# Patient Record
Sex: FEMALE | URBAN - METROPOLITAN AREA
[De-identification: names, ages, dates, MRNs, and addresses within clinical notes are randomized per-mention and may not be internally consistent; named-entity substitution may affect disease eponyms.]

---

## 2017-03-01 ENCOUNTER — TELEPHONE (OUTPATIENT)
Dept: OTHER | Facility: CLINIC | Age: 62
End: 2017-03-01

## 2017-03-01 NOTE — TELEPHONE ENCOUNTER
Call Regarding Onboarding are    Attempt 1    Message on voicemail     Comments:       Outreach   Vivian Ridley

## 2017-06-19 ENCOUNTER — TRANSFERRED RECORDS (OUTPATIENT)
Dept: HEALTH INFORMATION MANAGEMENT | Facility: CLINIC | Age: 62
End: 2017-06-19

## 2017-07-24 ENCOUNTER — OFFICE VISIT (OUTPATIENT)
Dept: FAMILY MEDICINE | Facility: CLINIC | Age: 62
End: 2017-07-24
Payer: COMMERCIAL

## 2017-07-24 VITALS
BODY MASS INDEX: 19.81 KG/M2 | HEIGHT: 64 IN | HEART RATE: 58 BPM | RESPIRATION RATE: 16 BRPM | SYSTOLIC BLOOD PRESSURE: 118 MMHG | TEMPERATURE: 96.3 F | OXYGEN SATURATION: 100 % | WEIGHT: 116 LBS | DIASTOLIC BLOOD PRESSURE: 73 MMHG

## 2017-07-24 DIAGNOSIS — Z00.00 ROUTINE GENERAL MEDICAL EXAMINATION AT A HEALTH CARE FACILITY: Primary | ICD-10-CM

## 2017-07-24 DIAGNOSIS — N36.9 URETHRAL LESION: ICD-10-CM

## 2017-07-24 LAB
CHOLEST SERPL-MCNC: 217 MG/DL
GLUCOSE SERPL-MCNC: 96 MG/DL (ref 70–99)
HDLC SERPL-MCNC: 102 MG/DL
LDLC SERPL CALC-MCNC: 103 MG/DL
NONHDLC SERPL-MCNC: 115 MG/DL
TRIGL SERPL-MCNC: 58 MG/DL

## 2017-07-24 PROCEDURE — 87624 HPV HI-RISK TYP POOLED RSLT: CPT | Performed by: FAMILY MEDICINE

## 2017-07-24 PROCEDURE — 99386 PREV VISIT NEW AGE 40-64: CPT | Performed by: FAMILY MEDICINE

## 2017-07-24 PROCEDURE — 80061 LIPID PANEL: CPT | Performed by: FAMILY MEDICINE

## 2017-07-24 PROCEDURE — 82947 ASSAY GLUCOSE BLOOD QUANT: CPT | Performed by: FAMILY MEDICINE

## 2017-07-24 PROCEDURE — 36415 COLL VENOUS BLD VENIPUNCTURE: CPT | Performed by: FAMILY MEDICINE

## 2017-07-24 PROCEDURE — G0123 SCREEN CERV/VAG THIN LAYER: HCPCS | Performed by: FAMILY MEDICINE

## 2017-07-24 NOTE — MR AVS SNAPSHOT
After Visit Summary   7/24/2017    Madeline Strange    MRN: [id number]           Patient Information     Date Of Birth          1955        Visit Information        Provider Department      7/24/2017 8:40 AM Lisa Murillo MD [facility]        Today's Diagnoses     Routine general medical examination at a health care facility    -  1    Urethral lesion          Care Instructions      Preventive Health Recommendations  Female Ages 50 - 64    Yearly exam: See your health care provider every year in order to  o Review health changes.   o Discuss preventive care.    o Review your medicines if your doctor has prescribed any.      Get a Pap test every three years (unless you have an abnormal result and your provider advises testing more often).    If you get Pap tests with HPV test, you only need to test every 5 years, unless you have an abnormal result.     You do not need a Pap test if your uterus was removed (hysterectomy) and you have not had cancer.    You should be tested each year for STDs (sexually transmitted diseases) if you're at risk.     Have a mammogram every 1 to 2 years.    Have a colonoscopy at age 50, or have a yearly FIT test (stool test). These exams screen for colon cancer.      Have a cholesterol test every 5 years, or more often if advised.    Have a diabetes test (fasting glucose) every three years. If you are at risk for diabetes, you should have this test more often.     If you are at risk for osteoporosis (brittle bone disease), think about having a bone density scan (DEXA).    Shots: Get a flu shot each year. Get a tetanus shot every 10 years.    Nutrition:     Eat at least 5 servings of fruits and vegetables each day.    Eat whole-grain bread, whole-wheat pasta and brown rice instead of white grains and rice.    Talk to your provider about Calcium and Vitamin D.     Lifestyle    Exercise at least 150 minutes a week (30 minutes a day, 5 days a week).  This will help you control your weight and prevent disease.    Limit alcohol to one drink per day.    No smoking.     Wear sunscreen to prevent skin cancer.     See your dentist every six months for an exam and cleaning.    See your eye doctor every 1 to 2 years.            Follow-ups after your visit        Additional Services     UROLOGY ADULT REFERRAL       Your provider has referred you to: G: [facility] [phone] [email or web address]  [facility] [place] [facility] - [place] [phone]   [email or web address]    Please be aware that coverage of these services is subject to the terms and limitations of your health insurance plan.  Call member services at your health plan with any benefit or coverage questions.      Please bring the following with you to your appointment:    (1) Any X-Rays, CTs or MRIs which have been performed.  Contact the facility where they were done to arrange for  prior to your scheduled appointment.    (2) List of current medications  (3) This referral request   (4) Any documents/labs given to you for this referral                  Who to contact     If you have questions or need follow up information about today's clinic visit or your schedule please contact [facility] directly at [phone].  Normal or non-critical lab and imaging results will be communicated to you by MyChart, letter or phone within 4 business days after the clinic has received the results. If you do not hear from us within 7 days, please contact the clinic through MyChart or phone. If you have a critical or abnormal lab result, we will notify you by phone as soon as possible.  Submit refill requests through Walk-in Appointment Scheduler or call your pharmacy and they will forward the refill request to us. Please allow 3 business days for your refill to  "be completed.          Additional Information About Your Visit        [employer] Information     [employer] lets you send messages to your doctor, view your test results, renew your prescriptions, schedule appointments and more. To sign up, go to [email or web address] . Click on \"Log in\" on the left side of the screen, which will take you to the Welcome page. Then click on \"Sign up Now\" on the right side of the page.     You will be asked to enter the access code listed below, as well as some personal information. Please follow the directions to create your username and password.     Your access code is: [email or web address]  Expires: 10/22/2017  8:58 AM     Your access code will  in 90 days. If you need help or a new code, please call your [facility] clinic or [phone].        Care EveryWhere ID     This is your Care EveryWhere ID. This could be used by other organizations to access your [facility] medical records  [id number]        Your Vitals Were     Pulse Temperature Respirations Height Pulse Oximetry BMI (Body Mass Index)    58 96.3  F (35.7  C) (Tympanic) 16 5' 4\" (1.626 m) 100% 19.91 kg/m2       Blood Pressure from Last 3 Encounters:   17 118/73    Weight from Last 3 Encounters:   17 116 lb (52.6 kg)              We Performed the Following     GLUCOSE     HPV High Risk Types DNA Cervical     Lipid Profile     Pap imaged thin layer screen with HPV - recommended age 30 - 65     UROLOGY ADULT REFERRAL        Primary Care Provider Office Phone # Fax #    Lisa Murillo [phone]       [facility] [address] [place] [address]        Equal Access to Services     [facility] TRINITY AH: Hadii philly morillo hadashmichelle Solucy, waaxda luqadaha, qaybta kaalmada adelawrenceyada, valdo dotson. So [facility] [phone].    ATENCIÓN: Si habla español, tiene a nick disposición servicios gratuitos de asistencia lingüística. Llame al [phone].    We comply with " applicable federal civil rights laws and [place] laws. We do not discriminate on the basis of race, color, national origin, age, disability sex, sexual orientation or gender identity.            Thank you!     Thank you for choosing [facility]  for your care. Our goal is always to provide you with excellent care. Hearing back from our patients is one way we can continue to improve our services. Please take a few minutes to complete the written survey that you may receive in the mail after your visit with us. Thank you!             Your Updated Medication List - Protect others around you: Learn how to safely use, store and throw away your medicines at www.disposemymeds.org.          This list is accurate as of: 7/24/17  8:58 AM.  Always use your most recent med list.                   Brand Name Dispense Instructions for use Diagnosis    aspirin 81 MG tablet      Take 81 mg by mouth daily    Routine general medical examination at a health care facility       calcium-vitamin D 600-400 MG-UNIT per tablet    CALTRATE     Take 1 tablet by mouth    Routine general medical examination at a health care facility

## 2017-07-24 NOTE — NURSING NOTE
"Chief Complaint   Patient presents with     Physical       Initial /73 (BP Location: Right arm, Patient Position: Sitting, Cuff Size: Adult Regular)  Pulse 58  Temp 96.3  F (35.7  C) (Tympanic)  Resp 16  Ht 5' 4\" (1.626 m)  Wt 116 lb (52.6 kg)  SpO2 100%  BMI 19.91 kg/m2 Estimated body mass index is 19.91 kg/(m^2) as calculated from the following:    Height as of this encounter: 5' 4\" (1.626 m).    Weight as of this encounter: 116 lb (52.6 kg).  Medication Reconciliation: complete         Diann Caldera MA      "

## 2017-07-24 NOTE — PROGRESS NOTES
SUBJECTIVE:   CC: Madeline Strange is an 62 year old male who presents for preventative health visit.     Physical   Annual:     Getting at least 3 servings of Calcium per day::  Yes    Bi-annual eye exam::  Yes    Dental care twice a year::  Yes    Sleep apnea or symptoms of sleep apnea::  None    Diet::  Regular (no restrictions)    Frequency of exercise::  4-5 days/week    Duration of exercise::  30-45 minutes    Taking medications regularly::  Not Applicable    Additional concerns today::  YES (pt will like a visual inspection during pap smear , lip problem)    CV: no personal h/o CVD, though much in her family hx.  Lipids checked last year were doing well.   Malignancy:  Up to date on colonoscopy and mammogram.  Pap is also up to date, but patient prefers repeat, as she has noticed changes.  About 2 weeks ago, there was a spot of blood in her underpants, and she looked and saw some redness and a black spot in the vaginal area.  She also notes that her dermatologist applied cryotherapy to a precancerous lesion on her lower lip in June; she has noticed a lighter spot in this area.  It is asymptomatic  Bone health: osteopenia, taking calcium and D, last BDS 6/8/15, repeat in 3-5 years.  Mother and sister both with osteoporosis.    Immunizations: up to date on tdap last year, zoster done 2013.  Sexual health: monogamous with , have slowed down in sexual activity, uses [place] to help with postmenopausal changes.    Depression screen: neg          Today's PHQ-2 Score:   PHQ-2 ( 1999 [employer]) 7/24/2017   Q1: Little interest or pleasure in doing things 0   Q2: Feeling down, depressed or hopeless 0   PHQ-2 Score 0   Q1: Little interest or pleasure in doing things Not at all   Q2: Feeling down, depressed or hopeless Not at all   PHQ-2 Score 0       Abuse: Current or Past(Physical, Sexual or Emotional)- No  Do you feel safe in your environment - Yes    Social History   Substance Use Topics     Smoking status: Not on  "file     Smokeless tobacco: Not on file     Alcohol use Not on file     The patient does not drink >3 drinks per day nor >7 drinks per week.    Last PSA: No results found for: PSA    Reviewed orders with patient. Reviewed health maintenance and updated orders accordingly - Yes  Patient Active Problem List   Diagnosis     Osteopenia     Past Surgical History:   Procedure Laterality Date     TUBAL LIGATION         Social History   Substance Use Topics     Smoking status: Never Smoker     Smokeless tobacco: Never Used     Alcohol use Yes      Comment: 1 a day      Family History   Problem Relation Age of Onset     OSTEOPOROSIS Mother      Osteopenia Sister          Current Outpatient Prescriptions   Medication Sig Dispense Refill     aspirin 81 MG tablet Take 81 mg by mouth daily       calcium-vitamin D (CALTRATE) 600-400 MG-UNIT per tablet Take 1 tablet by mouth       No Known Allergies      Reviewed and updated as needed this visit by clinical staff         Reviewed and updated as needed this visit by Provider            ROS:  C: NEGATIVE for fever, chills, change in weight  I: NEGATIVE for worrisome rashes, moles or lesions  E: NEGATIVE for vision changes or irritation  ENT: NEGATIVE for ear, mouth and throat problems  R: NEGATIVE for significant cough or SOB  CV: NEGATIVE for chest pain, palpitations or peripheral edema  GI: NEGATIVE for nausea, abdominal pain, heartburn, or change in bowel habits   male: negative for dysuria, hematuria, decreased urinary stream, erectile dysfunction, urethral discharge  M: NEGATIVE for significant arthralgias or myalgia  N: NEGATIVE for weakness, dizziness or paresthesias  P: NEGATIVE for changes in mood or affect    OBJECTIVE:   /73 (BP Location: Right arm, Patient Position: Sitting, Cuff Size: Adult Regular)  Pulse 58  Temp 96.3  F (35.7  C) (Tympanic)  Resp 16  Ht 5' 4\" (1.626 m)  Wt 116 lb (52.6 kg)  SpO2 100%  BMI 19.91 kg/m2    EXAM:  GENERAL: healthy, alert " and no distress  EYES: Eyes grossly normal to inspection, PERRL and conjunctivae and sclerae normal  HENT: ear canals and TM's normal, nose and mouth without ulcers or lesions; mild hypopigmentation to lower lip, no concerning lesions noted  NECK: no adenopathy, no asymmetry, masses, or scars and thyroid normal to palpation  RESP: lungs clear to auscultation - no rales, rhonchi or wheezes  BREAST: normal without masses, tenderness or nipple discharge and no palpable axillary masses or adenopathy  CV: regular rate and rhythm, normal S1 S2, no S3 or S4, no murmur, click or rub, no peripheral edema and peripheral pulses strong  ABDOMEN: soft, nontender, no hepatosplenomegaly, no masses and bowel sounds normal   (female): normal female external genitalia, urethral meatus with a tiny cyst or polyp, and there is a small area of erythema below the urethra on the mucosal surface, not actively bleeding, no pigmented lesions.   vaginal mucosa with vaginal atrophy  MS: no gross musculoskeletal defects noted, no edema  SKIN: no suspicious lesions or rashes  NEURO: Normal strength and tone, mentation intact and speech normal  PSYCH: mentation appears normal, affect normal/bright    ASSESSMENT/PLAN:   1. Routine general medical examination at a health care facility    - GLUCOSE  - Pap imaged thin layer screen with HPV - recommended age 30 - 65  - HPV High Risk Types DNA Cervical  - aspirin 81 MG tablet; Take 81 mg by mouth daily  - calcium-vitamin D (CALTRATE) 600-400 MG-UNIT per tablet; Take 1 tablet by mouth  - Lipid Profile    2. Urethral lesion    - UROLOGY ADULT REFERRAL    COUNSELING:   Reviewed preventive health counseling, as reflected in patient instructions       Osteoporosis Prevention/Bone Health           has no tobacco history on file.    There is no height or weight on file to calculate BMI.       Counseling Resources:  ATP IV Guidelines  Pooled Cohorts Equation Calculator  FRAX Risk Assessment  ICSI Preventive  Guidelines  Dietary Guidelines for Americans, 2010  USDA's MyPlate  ASA Prophylaxis  Lung CA Screening    Lisa Murillo MD  [facility]  Answers for HPI/ROS submitted by the patient on 7/24/2017   PHQ-2 Score: 0

## 2017-07-24 NOTE — Clinical Note
Please abstract the following data from this visit with this patient into the appropriate field in Epic:  Mammogram done on this date: 7/12/2016 (approximately), by this group: [facility], results were normal.  Pap smear done on this date: 7/20/2015 (approximately), by this group: [facility], results were normal.

## 2017-07-25 ENCOUNTER — PRE VISIT (OUTPATIENT)
Dept: UROLOGY | Facility: CLINIC | Age: 62
End: 2017-07-25

## 2017-07-25 NOTE — TELEPHONE ENCOUNTER
Patient with history of urethral lesion coming in for consult with Dr. Harris. Patient chart reviewed, no need for call, all records available and ready for appointment.

## 2017-07-26 ENCOUNTER — OFFICE VISIT (OUTPATIENT)
Dept: UROLOGY | Facility: CLINIC | Age: 62
End: 2017-07-26

## 2017-07-26 VITALS
HEART RATE: 70 BPM | HEIGHT: 64 IN | BODY MASS INDEX: 20.32 KG/M2 | DIASTOLIC BLOOD PRESSURE: 69 MMHG | WEIGHT: 119 LBS | SYSTOLIC BLOOD PRESSURE: 119 MMHG

## 2017-07-26 DIAGNOSIS — N36.2 URETHRAL CARUNCLE: Primary | ICD-10-CM

## 2017-07-26 DIAGNOSIS — N95.2 ATROPHIC VAGINITIS: Primary | ICD-10-CM

## 2017-07-26 DIAGNOSIS — N95.2 ATROPHIC VAGINITIS: ICD-10-CM

## 2017-07-26 LAB
COPATH REPORT: NORMAL
PAP: NORMAL

## 2017-07-26 ASSESSMENT — PAIN SCALES - GENERAL: PAINLEVEL: NO PAIN (0)

## 2017-07-26 NOTE — NURSING NOTE
"Chief Complaint   Patient presents with     Consult     urethral lesion       Blood pressure 119/69, pulse 70, height 1.626 m (5' 4\"), weight 54 kg (119 lb). Body mass index is 20.43 kg/(m^2).    Patient Active Problem List   Diagnosis     Osteopenia       No Known Allergies    Current Outpatient Prescriptions   Medication Sig Dispense Refill     aspirin 81 MG tablet Take 81 mg by mouth daily       calcium-vitamin D (CALTRATE) 600-400 MG-UNIT per tablet Take 1 tablet by mouth         Social History   Substance Use Topics     Smoking status: Never Smoker     Smokeless tobacco: Never Used     Alcohol use Yes      Comment: 1 a day        Viviana Hollis LPN  7/26/2017  8:42 AM    "

## 2017-07-26 NOTE — PROGRESS NOTES
"We are pleased to see Ms. Madeline Strange in consultation at the request of Dr. Murillo for the evaluation of chief complaint listed below    Chief Complaint:    Urethral lesion         History of Present Illness:   Madeline Strange is a(n) 62 year old female w/ PMH of osteopenia and no previous urologic history who presents with urethral lesion. Noticed a faint spot in underwear ~3 weeks ago, noticed a \"black spot\" on the urethra.  PCP was not sure what this is and referred her to us.  Betty pain, denies any bleeding. She is sexually active, denies dyspareunia. One UTI \"years ago.\"             Past Medical History:     Past Medical History:   Diagnosis Date     Osteopenia             Past Surgical History:     Past Surgical History:   Procedure Laterality Date     TUBAL LIGATION              Social History:   Retired .        Smoking: None  Alcohol: Rare  IV Drug Use: None         Family History:     Family History   Problem Relation Age of Onset     OSTEOPOROSIS Mother      Osteopenia Sister      No urologic cancers in the family.         Allergies:   No Known Allergies         Medications:     Current Outpatient Prescriptions   Medication Sig     aspirin 81 MG tablet Take 81 mg by mouth daily     calcium-vitamin D (CALTRATE) 600-400 MG-UNIT per tablet Take 1 tablet by mouth     No current facility-administered medications for this visit.             REVIEW OF SYSTEMS:    See HPI for pertinent details.  Remainder of 10-point ROS negative.         PHYSICAL EXAM   /69  Pulse 70  Ht 1.626 m (5' 4\")  Wt 54 kg (119 lb)  BMI 20.43 kg/m2  Body mass index is 20.43 kg/(m^2).  GENERAL: No acute distress. Well nourished.   HEENT:  Sclerae anicteric.  Conjunctivae pink.  Moist mucous membranes.  NECK:  Supple.  No lymphadenopathy.  CARDIAC:  Regular rate and rhythm.  LUNGS:  Non-labored breathing  BACK:  No costovertebral tenderness.  ABDOMEN: Soft, non-tender, no surgical scars, no " organomegaly, non-tender.  :  Normal external female genitalia.  Small urethral caruncle noted   SKIN: No rashes.  Dry.     EXTREMITIES:  Warm, well perfused.  No lower extremity edema bilaterally.  NEURO: normal gait, no focal deficits.           LABS AND IMAGING:   N/A          ASSESSMENT:   Madeline Strange is a 62 year old female who presents for asymptomatic urethral lesion - found to be urethral caruncle on exam.  Discussed with patient this is a result of being post-menopausal with a local estrogen deficiency.  It is benign and usually doesn't cause problems but if she would like to treat it we would recommend topical estrogen cream.  Otherwise she does not need to treat it but it may grow larger over time.  We discussed side effects of vaginal estrogen including breast tenderness and vaginal burning sensation.           PLAN:     Rx for vaginal estrogen. Apply daily x 2 weeks then 2-3x weekly with finger (not applicator).  Will send through different compounding pharmacy to decrease cost.    Return to clinic as needed    Bucky Siegel MD  Urology Resident    Patient was seen, evaluated and plan was formulated in conjunction with me and I agree with the above.  Alexander Harris MD      CC  Patient Care Team:  Lisa Murillo MD as PCP - General (Family Practice)

## 2017-07-26 NOTE — LETTER
"7/26/2017       RE: Madeline Strange  [address]  [place] [address]     Dear Colleague,    Thank you for referring your patient, Madeline Strange, to the [facility] UROLOGY AND INST FOR PROSTATE AND UROLOGIC CANCERS at [facility]. Please see a copy of my visit note below.    We are pleased to see Ms. Madeline Strange in consultation at the request of Dr. Murillo for the evaluation of chief complaint listed below    Chief Complaint:    Urethral lesion         History of Present Illness:   Madeline Strange is a(n) 62 year old female w/ PMH of osteopenia and no previous urologic history who presents with urethral lesion. Noticed a faint spot in underwear ~3 weeks ago, noticed a \"black spot\" on the urethra.  PCP was not sure what this is and referred her to us.  Betty pain, denies any bleeding. She is sexually active, denies dyspareunia. One UTI \"years ago.\"             Past Medical History:     Past Medical History:   Diagnosis Date     Osteopenia             Past Surgical History:     Past Surgical History:   Procedure Laterality Date     TUBAL LIGATION              Social History:   Retired .        Smoking: None  Alcohol: Rare  IV Drug Use: None         Family History:     Family History   Problem Relation Age of Onset     OSTEOPOROSIS Mother      Osteopenia Sister      No urologic cancers in the family.         Allergies:   No Known Allergies         Medications:     Current Outpatient Prescriptions   Medication Sig     aspirin 81 MG tablet Take 81 mg by mouth daily     calcium-vitamin D (CALTRATE) 600-400 MG-UNIT per tablet Take 1 tablet by mouth     No current facility-administered medications for this visit.             REVIEW OF SYSTEMS:    See HPI for pertinent details.  Remainder of 10-point ROS negative.         PHYSICAL EXAM   /69  Pulse 70  Ht 1.626 m (5' 4\")  Wt 54 kg (119 lb)  BMI 20.43 kg/m2  Body mass index is 20.43 " kg/(m^2).  GENERAL: No acute distress. Well nourished.   HEENT:  Sclerae anicteric.  Conjunctivae pink.  Moist mucous membranes.  NECK:  Supple.  No lymphadenopathy.  CARDIAC:  Regular rate and rhythm.  LUNGS:  Non-labored breathing  BACK:  No costovertebral tenderness.  ABDOMEN: Soft, non-tender, no surgical scars, no organomegaly, non-tender.  :  Normal external female genitalia.  Small urethral caruncle noted   SKIN: No rashes.  Dry.     EXTREMITIES:  Warm, well perfused.  No lower extremity edema bilaterally.  NEURO: normal gait, no focal deficits.           LABS AND IMAGING:   N/A          ASSESSMENT:   Madeline Strange is a 62 year old female who presents for asymptomatic urethral lesion - found to be urethral caruncle on exam.  Discussed with patient this is a result of being post-menopausal with a local estrogen deficiency.  It is benign and usually doesn't cause problems but if she would like to treat it we would recommend topical estrogen cream.  Otherwise she does not need to treat it but it may grow larger over time.  We discussed side effects of vaginal estrogen including breast tenderness and vaginal burning sensation.           PLAN:     Rx for vaginal estrogen. Apply daily x 2 weeks then 2-3x weekly with finger (not applicator).  Will send through different compounding pharmacy to decrease cost.    Return to clinic as needed    Bucky Siegel MD  Urology Resident    Patient was seen, evaluated and plan was formulated in conjunction with me and I agree with the above.  Alexander Harris MD    CC  Patient Care Team:  Lisa Murillo MD as PCP - General (Family Practice)

## 2017-07-26 NOTE — MR AVS SNAPSHOT
After Visit Summary   7/26/2017    Madeline Strange    MRN: [id number]           Patient Information     Date Of Birth          1955        Visit Information        Provider Department      7/26/2017 9:00 AM Alexander Harris MD [facility]        Today's Diagnoses     Urethral caruncle    -  1    Atrophic vaginitis           Follow-ups after your visit        Follow-up notes from your care team     Return if symptoms worsen or fail to improve.      Who to contact     Please call your clinic at [phone] to:    Ask questions about your health    Make or cancel appointments    Discuss your medicines    Learn about your test results    Speak to your doctor   If you have compliments or concerns about an experience at your clinic, or if you wish to file a complaint, please contact [facility] Patient Relations at [phone] or email us at [email or web address]         Additional Information About Your Visit        MyChart Information     Nanobiotixt gives you secure access to your electronic health record. If you see a primary care provider, you can also send messages to your care team and make appointments. If you have questions, please call your primary care clinic.  If you do not have a primary care provider, please call [phone] and they will assist you.      Zite is an electronic gateway that provides easy, online access to your medical records. With Zite, you can request a clinic appointment, read your test results, renew a prescription or communicate with your care team.     To access your existing account, please contact your [facility] or call [phone] for assistance.        Care EveryWhere ID     This is your Care EveryWhere ID. This could be used by other organizations to access your [place] medical records  [id number]        Your Vitals Were     Pulse  "Height BMI (Body Mass Index)             70 1.626 m (5' 4\") 20.43 kg/m2          Blood Pressure from Last 3 Encounters:   07/26/17 119/69   07/24/17 118/73    Weight from Last 3 Encounters:   07/26/17 54 kg (119 lb)   07/24/17 52.6 kg (116 lb)              Today, you had the following     No orders found for display         Today's Medication Changes          These changes are accurate as of: 7/26/17  9:23 AM.  If you have any questions, ask your nurse or doctor.               Start taking these medicines.        Dose/Directions    COMPOUNDED NON-CONTROLLED SUBSTANCE - PHARMACY TO MIX COMPOUNDED MEDICATION   Commonly known as:  CMPD RX   Used for:  Atrophic vaginitis   Started by:  Alexander Harris MD        Estriol 1 mg/g Apply small amount to finger and apply to inside vagina daily for 2 weeks then twice weekly Route: vaginally   Quantity:  30 g   Refills:  3            Where to get your medicines      Some of these will need a paper prescription and others can be bought over the counter.  Ask your nurse if you have questions.     Bring a paper prescription for each of these medications     COMPOUNDED NON-CONTROLLED SUBSTANCE - PHARMACY TO MIX COMPOUNDED MEDICATION                Primary Care Provider Office Phone # Fax #    Lisa Murillo [phone]       [address] [place] [address]        Equal Access to Services     CHAVO PETERSEN AH: Hadii philly morillo hadasho Soomaali, waaxda luqadaha, qaybta kaalmada adeegyada, valdo dotson. So [facility] [phone].    ATENCIÓN: Si habla español, tiene a nick disposición servicios gratuitos de asistencia lingüística. Llame al [phone].    We comply with applicable federal civil rights laws and [place] laws. We do not discriminate on the basis of race, color, national origin, age, disability sex, sexual orientation or gender identity.            Thank you!     Thank you for choosing [place] [facility]  for your care. Our goal is always to provide you with excellent care. Hearing back from our patients is one way we can continue to improve our services. Please take a few minutes to complete the written survey that you may receive in the mail after your visit with us. Thank you!             Your Updated Medication List - Protect others around you: Learn how to safely use, store and throw away your medicines at www.disposemymeds.org.          This list is accurate as of: 7/26/17  9:23 AM.  Always use your most recent med list.                   Brand Name Dispense Instructions for use Diagnosis    aspirin 81 MG tablet      Take 81 mg by mouth daily    Routine general medical examination at a health care facility       calcium-vitamin D 600-400 MG-UNIT per tablet    CALTRATE     Take 1 tablet by mouth    Routine general medical examination at a health care facility       COMPOUNDED NON-CONTROLLED SUBSTANCE - PHARMACY TO MIX COMPOUNDED MEDICATION    CMPD RX    30 g    Estriol 1 mg/g Apply small amount to finger and apply to inside vagina daily for 2 weeks then twice weekly Route: vaginally    Atrophic vaginitis

## 2017-07-28 LAB
FINAL DIAGNOSIS: NORMAL
HPV HR 12 DNA CVX QL NAA+PROBE: NEGATIVE
HPV16 DNA SPEC QL NAA+PROBE: NEGATIVE
HPV18 DNA SPEC QL NAA+PROBE: NEGATIVE
SPECIMEN DESCRIPTION: NORMAL

## 2017-08-09 DIAGNOSIS — N95.2 ATROPHIC VAGINITIS: Primary | ICD-10-CM

## 2017-12-05 ENCOUNTER — ALLIED HEALTH/NURSE VISIT (OUTPATIENT)
Dept: NURSING | Facility: CLINIC | Age: 62
End: 2017-12-05
Payer: COMMERCIAL

## 2017-12-05 DIAGNOSIS — Z23 NEED FOR PROPHYLACTIC VACCINATION AND INOCULATION AGAINST INFLUENZA: Primary | ICD-10-CM

## 2017-12-05 PROCEDURE — 99207 ZZC NO CHARGE NURSE ONLY: CPT

## 2017-12-05 PROCEDURE — 90688 IIV4 VACCINE SPLT 0.5 ML IM: CPT

## 2017-12-05 PROCEDURE — 90471 IMMUNIZATION ADMIN: CPT

## 2017-12-05 NOTE — PROGRESS NOTES

## 2017-12-05 NOTE — MR AVS SNAPSHOT
After Visit Summary   12/5/2017    Madeline Strange    MRN: [id number]           Patient Information     Date Of Birth          1955        Visit Information        Provider Department      12/5/2017 9:00 AM [facility]        Today's Diagnoses     Need for prophylactic vaccination and inoculation against influenza    -  1       Follow-ups after your visit        Who to contact     If you have questions or need follow up information about today's clinic visit or your schedule please contact [facility] directly at [phone].  Normal or non-critical lab and imaging results will be communicated to you by Zeetlhart, letter or phone within 4 business days after the clinic has received the results. If you do not hear from us within 7 days, please contact the clinic through Dedicated Devicest or phone. If you have a critical or abnormal lab result, we will notify you by phone as soon as possible.  Submit refill requests through Swivel or call your pharmacy and they will forward the refill request to us. Please allow 3 business days for your refill to be completed.          Additional Information About Your Visit        MyChart Information     Swivel gives you secure access to your electronic health record. If you see a primary care provider, you can also send messages to your care team and make appointments. If you have questions, please call your primary care clinic.  If you do not have a primary care provider, please call [phone] and they will assist you.        Care EveryWhere ID     This is your Care EveryWhere ID. This could be used by other organizations to access your [place] medical records  [id number]         Blood Pressure from Last 3 Encounters:   07/26/17 119/69   07/24/17 118/73    Weight from Last 3 Encounters:   07/26/17 119 lb (54 kg)   07/24/17 116 lb (52.6 kg)              We Performed the Following     FLU VACCINE, 3 YRS +,  IM (QUADRIVALENT W/PRESERVATIVES/MULTI-DOSE) [47053]     Vaccine Administration, Initial [61137]        Primary Care Provider Office Phone # Fax #    Lisa Murillo [phone] [address] [place] [address]        Equal Access to Services     ISABELLE PETERSEN : Hadii aad ku hadasho Soomaali, waaxda luqadaha, qaybta kaalmada adeegyada, waxay idiin hayaan adeeg khvishaltony lajoen . So [facility] [phone].    ATENCIÓN: Si habla español, tiene a nick disposición servicios gratuitos de asistencia lingüística. Nayaname al [phone].    We comply with applicable federal civil rights laws and [place] laws. We do not discriminate on the basis of race, color, national origin, age, disability, sex, sexual orientation, or gender identity.            Thank you!     Thank you for choosing [facility]  for your care. Our goal is always to provide you with excellent care. Hearing back from our patients is one way we can continue to improve our services. Please take a few minutes to complete the written survey that you may receive in the mail after your visit with us. Thank you!             Your Updated Medication List - Protect others around you: Learn how to safely use, store and throw away your medicines at www.disposemymeds.org.          This list is accurate as of: 12/5/17  9:03 AM.  Always use your most recent med list.                   Brand Name Dispense Instructions for use Diagnosis    aspirin 81 MG tablet      Take 81 mg by mouth daily    Routine general medical examination at a health care facility       calcium-vitamin D 600-400 MG-UNIT per tablet    CALTRATE     Take 1 tablet by mouth    Routine general medical examination at a health care facility       COMPOUNDED NON-CONTROLLED SUBSTANCE - PHARMACY TO MIX COMPOUNDED MEDICATION    CMPD RX    30 g    Estriol 1 mg/g Apply small amount to finger and apply to inside vagina daily for 2 weeks then twice weekly Route:  vaginally    Atrophic vaginitis

## 2019-10-05 ENCOUNTER — HEALTH MAINTENANCE LETTER (OUTPATIENT)
Age: 64
End: 2019-10-05

## 2020-02-10 ENCOUNTER — HEALTH MAINTENANCE LETTER (OUTPATIENT)
Age: 65
End: 2020-02-10

## 2020-11-14 ENCOUNTER — HEALTH MAINTENANCE LETTER (OUTPATIENT)
Age: 65
End: 2020-11-14

## 2021-03-28 ENCOUNTER — HEALTH MAINTENANCE LETTER (OUTPATIENT)
Age: 66
End: 2021-03-28

## 2021-09-12 ENCOUNTER — HEALTH MAINTENANCE LETTER (OUTPATIENT)
Age: 66
End: 2021-09-12

## 2021-11-07 ENCOUNTER — HEALTH MAINTENANCE LETTER (OUTPATIENT)
Age: 66
End: 2021-11-07

## 2022-04-24 ENCOUNTER — HEALTH MAINTENANCE LETTER (OUTPATIENT)
Age: 67
End: 2022-04-24

## 2022-11-19 ENCOUNTER — HEALTH MAINTENANCE LETTER (OUTPATIENT)
Age: 67
End: 2022-11-19

## 2023-06-01 ENCOUNTER — HEALTH MAINTENANCE LETTER (OUTPATIENT)
Age: 68
End: 2023-06-01

## 2023-11-18 ENCOUNTER — HEALTH MAINTENANCE LETTER (OUTPATIENT)
Age: 68
End: 2023-11-18